# Patient Record
Sex: MALE | Race: ASIAN | NOT HISPANIC OR LATINO | Employment: FULL TIME | URBAN - METROPOLITAN AREA
[De-identification: names, ages, dates, MRNs, and addresses within clinical notes are randomized per-mention and may not be internally consistent; named-entity substitution may affect disease eponyms.]

---

## 2020-10-15 ENCOUNTER — LAB REQUISITION (OUTPATIENT)
Dept: LAB | Facility: HOSPITAL | Age: 30
End: 2020-10-15
Payer: OTHER GOVERNMENT

## 2020-10-15 DIAGNOSIS — Z11.59 ENCOUNTER FOR SCREENING FOR OTHER VIRAL DISEASES: ICD-10-CM

## 2020-10-15 LAB — SARS-COV-2 RNA RESP QL NAA+PROBE: NEGATIVE

## 2020-10-15 PROCEDURE — U0003 INFECTIOUS AGENT DETECTION BY NUCLEIC ACID (DNA OR RNA); SEVERE ACUTE RESPIRATORY SYNDROME CORONAVIRUS 2 (SARS-COV-2) (CORONAVIRUS DISEASE [COVID-19]), AMPLIFIED PROBE TECHNIQUE, MAKING USE OF HIGH THROUGHPUT TECHNOLOGIES AS DESCRIBED BY CMS-2020-01-R: HCPCS | Performed by: PHYSICIAN ASSISTANT

## 2020-12-21 ENCOUNTER — IMMUNIZATIONS (OUTPATIENT)
Dept: FAMILY MEDICINE CLINIC | Facility: HOSPITAL | Age: 30
End: 2020-12-21
Payer: COMMERCIAL

## 2020-12-21 DIAGNOSIS — Z23 ENCOUNTER FOR IMMUNIZATION: ICD-10-CM

## 2020-12-21 PROCEDURE — 91300 SARS-COV-2 / COVID-19 MRNA VACCINE (PFIZER-BIONTECH) 30 MCG: CPT

## 2020-12-21 PROCEDURE — 0001A SARS-COV-2 / COVID-19 MRNA VACCINE (PFIZER-BIONTECH) 30 MCG: CPT

## 2021-01-12 ENCOUNTER — IMMUNIZATIONS (OUTPATIENT)
Dept: FAMILY MEDICINE CLINIC | Facility: HOSPITAL | Age: 31
End: 2021-01-12

## 2021-01-12 DIAGNOSIS — Z23 ENCOUNTER FOR IMMUNIZATION: ICD-10-CM

## 2021-01-12 PROCEDURE — 0002A SARS-COV-2 / COVID-19 MRNA VACCINE (PFIZER-BIONTECH) 30 MCG: CPT

## 2021-01-12 PROCEDURE — 91300 SARS-COV-2 / COVID-19 MRNA VACCINE (PFIZER-BIONTECH) 30 MCG: CPT

## 2021-04-22 ENCOUNTER — OFFICE VISIT (OUTPATIENT)
Dept: DERMATOLOGY | Facility: CLINIC | Age: 31
End: 2021-04-22
Payer: COMMERCIAL

## 2021-04-22 VITALS — TEMPERATURE: 94.6 F | WEIGHT: 152.2 LBS

## 2021-04-22 DIAGNOSIS — D48.5 NEOPLASM OF UNCERTAIN BEHAVIOR OF SKIN OF BACK: Primary | ICD-10-CM

## 2021-04-22 PROCEDURE — 99204 OFFICE O/P NEW MOD 45 MIN: CPT | Performed by: DERMATOLOGY

## 2021-04-22 NOTE — PATIENT INSTRUCTIONS
Assessment and Plan:   I have discussed with the patient that a sample of skin via a "skin biopsy would be potentially helpful to further make a specific diagnosis under the microscope     Based on a thorough discussion of this condition and the management approach to it (including a comprehensive discussion of the known risks, side effects and potential benefits of treatment), the patient (family) agrees to implement the following specific plan:    o Procedure:  Skin Biopsy discussed; deferred at this time due to irritation on site of spot of concern  o Monitor at this time; follow up 1 month, or as needed   o Start applying Triamcinolone 0 1 % ointment  twice a day for 2 weeks straight

## 2021-04-22 NOTE — PROGRESS NOTES
[address] [facility] Note     Patient Name: Alma Genao  Encounter Date: 04/22/21     Have you been cared for by a [facility] Dermatologist in the last 3 years and, if so, which one? No    · Have you traveled outside of the [address] [place] [address] in the past 3 months or outside of the [facility] area in the last 2 weeks? No     May we call your Preferred Phone number to discuss your specific medical information? Yes     May we leave a detailed message that includes your specific medical information? Yes      Today's Chief Concerns:   Concern #1:  Spot on back    Concern #2:      Past Medical History:  Have you personally ever had or currently have any of the following? · Skin cancer (such as Melanoma, Basal Cell Carcinoma, Squamous Cell Carcinoma? (If Yes, please provide more detail)- No  · Eczema: No  · Psoriasis: No  · HIV/AIDS: No  · Hepatitis B or C: No  · Tuberculosis: No  · Systemic Immunosuppression such as Diabetes, Biologic or Immunotherapy, Chemotherapy, Organ Transplantation, Bone Marrow Transplantation (If YES, please provide more detail): No  · Radiation Treatment (If YES, please provide more detail): No  · Any other major medical conditions/concerns? 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 Reading     Family History:  Have any of your "first degree relatives" (parent, brother, sister, or child) had any of the following       · Skin cancer such as Melanoma or Merkel Cell Carcinoma or Pancreatic Cancer? No  · Eczema, Asthma, Hay Fever or Seasonal Allergies: No  · Psoriasis or Psoriatic Arthritis: No  · Do any other medical conditions seem to run in your family? If Yes, what condition and which relatives? No    Current Medications:       No current outpatient medications on file  Review of Systems:  Have you recently had or currently have any of the following?   If YES, what are you doing for the problem? · Fever, chills or unintended weight loss: No  · Sudden loss or change in your vision: No  · Nausea, vomiting or blood in your stool: No  · Painful or swollen joints: No  · Wheezing or cough: No  · Changing mole or non-healing wound: No  · Nosebleeds: No  · Excessive sweating: No  · Easy or prolonged bleeding? No  · Over the last 2 weeks, how often have you been bothered by the following problems? · Taking little interest or pleasure in doing things: 1 - Not at All  · Feeling down, depressed, or hopeless: 1 - Not at All  · Rapid heartbeat with epinephrine:  No    · FEMALES ONLY:    · Are you pregnant or planning to become pregnant? N/A  · Are you currently or planning to be nursing or breast feeding? N/A    · Any known allergies? Not on File      Physical Exam:     Was a chaperone (Derm Clinical Assistant) present throughout the entire Physical Exam? Yes     Did the Dermatology Team specifically  the patient on the importance of a Full Skin Exam to be sure that nothing is missed clinically? Yes}  o Did the patient ultimately request or accept a Full Skin Exam?  NO, spot of concern       CONSTITUTIONAL:   Vitals:    04/22/21 0936   Temp: (!) 94 6 °F (34 8 °C)   Weight: 69 kg (152 lb 3 2 oz)         PSYCH: Normal mood and affect  EYES: Normal conjunctiva  ENT: Normal lips and oral mucosa  CARDIOVASCULAR: No edema  RESPIRATORY: Normal respirations  HEME/LYMPH/IMMUNO:  No regional lymphadenopathy except as noted below in "ASSESSMENT AND PLAN BY DIAGNOSIS"    SKIN:  FULL ORGAN SYSTEM EXAM   Face Normal except as noted below in Assessment   Neck,  Normal except as noted below in Assessment   Back/Spine Normal except as noted below in Assessment        Assessment and Plan by Diagnosis:    History of Present Condition:     Patient is present to establish care to discuss spot that appeared or noticed on back a 6 months ago  Patient reports some itchiness sometimes  Denies any bleeding, pain, changes in size, redness or irritation  Patient has no personal or family history of skin cancer  Patient denies trying to treat area  NEOPLASM OF UNCERTAIN BEHAVIOR OF SKIN    Physical Exam:   (Anatomic Location); (Size and Morphological Description); (Differential Diagnosis):  o symmetrical brown macule with surrounding erythematous pathces   Pertinent Positives:   Pertinent Negatives:    Assessment and Plan:   I have discussed with the patient that a sample of skin via a "skin biopsy would be potentially helpful to further make a specific diagnosis under the microscope   Based on a thorough discussion of this condition and the management approach to it (including a comprehensive discussion of the known risks, side effects and potential benefits of treatment), the patient (family) agrees to implement the following specific plan:    o Procedure:  Skin Biopsy discussed; deferred at this time due to irritation on site of spot of concern  o Monitor at this time; follow up 1 month, or as needed   o Start applying Triamcinolone 0 1 % ointment  twice a day for 2 weeks straight       Right upper buttock with symmetric brown macule with surrounding erythema  Discussed treatments and as is inflamed now, will treat with triamcinolone twice daily for three weeks with plan to re-evaluate once inflammation is resolved  Discussed biopsy as well but patient prefers treatment with the triamcinolone first   Medication side effects discussed and instructions given      Scribe Attestation    I,:  Mansi Gilliland MA am acting as a scribe while in the presence of the attending physician :       I,:  eKyon Lilly MD personally performed the services described in this documentation    as scribed in my presence :

## 2021-06-02 ENCOUNTER — TELEPHONE (OUTPATIENT)
Dept: DERMATOLOGY | Facility: CLINIC | Age: 31
End: 2021-06-02

## 2021-06-02 NOTE — TELEPHONE ENCOUNTER
Pt would like to have skin lesion removed, not sure if it is a mohs procedure or regular procedure, he is going to upload a picture to [facility]  Thank you!

## 2021-06-03 NOTE — TELEPHONE ENCOUNTER
Pt having issues with his MyChart (has duplicate accounts and wont be fixed for ~7days)  Pt is going to tiger text you to see if it would be ok to send to your hospital email his pictures  Pt states that he is more worried as there are more lesions on his back now than before and is more apprehensive about it  Thank you!

## 2021-06-07 ENCOUNTER — OFFICE VISIT (OUTPATIENT)
Dept: FAMILY MEDICINE CLINIC | Facility: CLINIC | Age: 31
End: 2021-06-07
Payer: COMMERCIAL

## 2021-06-07 VITALS
OXYGEN SATURATION: 97 % | SYSTOLIC BLOOD PRESSURE: 110 MMHG | DIASTOLIC BLOOD PRESSURE: 58 MMHG | BODY MASS INDEX: 26.16 KG/M2 | HEIGHT: 64 IN | RESPIRATION RATE: 18 BRPM | WEIGHT: 153.25 LBS | HEART RATE: 80 BPM | TEMPERATURE: 98.2 F

## 2021-06-07 DIAGNOSIS — Z11.4 SCREENING FOR HIV (HUMAN IMMUNODEFICIENCY VIRUS): ICD-10-CM

## 2021-06-07 DIAGNOSIS — L98.9 SKIN LESION: ICD-10-CM

## 2021-06-07 DIAGNOSIS — Z23 ENCOUNTER FOR IMMUNIZATION: ICD-10-CM

## 2021-06-07 DIAGNOSIS — Z00.00 ANNUAL PHYSICAL EXAM: Primary | ICD-10-CM

## 2021-06-07 DIAGNOSIS — Z11.59 NEED FOR HEPATITIS C SCREENING TEST: ICD-10-CM

## 2021-06-07 PROCEDURE — 90715 TDAP VACCINE 7 YRS/> IM: CPT

## 2021-06-07 PROCEDURE — 3725F SCREEN DEPRESSION PERFORMED: CPT | Performed by: FAMILY MEDICINE

## 2021-06-07 PROCEDURE — 99385 PREV VISIT NEW AGE 18-39: CPT | Performed by: FAMILY MEDICINE

## 2021-06-07 PROCEDURE — 90471 IMMUNIZATION ADMIN: CPT

## 2021-06-07 NOTE — PROGRESS NOTES
Assessment/Plan:      1  Annual physical exam  See below   - Comprehensive metabolic panel; Future  - Lipid panel; Future  - C-reactive protein; Future  - Lyme Total Antibody Profile with reflex to WB; Future  - Sedimentation rate, automated; Future    2  Encounter for immunization  - TDAP VACCINE GREATER THAN OR EQUAL TO 6YO IM    3  Need for hepatitis C screening test  - Hepatitis C antibody; Future    4  Screening for HIV (human immunodeficiency virus)  - HIV 1/2 Antigen/Antibody (4th Generation) w Reflex [facility]; Future    5  Skin lesion  To have biopsy with derm tomorrow   Appears to be inflammation   Will order CRP/lyme   - Comprehensive metabolic panel; Future  - Lipid panel; Future  - C-reactive protein; Future  - Lyme Total Antibody Profile with reflex to WB; Future  - Sedimentation rate, automated; Future      Well adult exam  ·         Continue healthy diet   ·         Encourage exercise 4 times a week or more for minimum 30 minutes  ·         Continue to see dentist, wear seatbelt  ·         Health maintenance reviewed and update labs, Tdap today  Reviewed age appropriate health maintenance screenings and immunizations that are due, risks and benefits of these  Health Maintenance   Topic Date Due    HIV Screening  Never done    BMI: Followup Plan  Never done    Annual Physical  Never done    Influenza Vaccine (Season Ended) 09/01/2021    Depression Screening PHQ  06/07/2022    BMI: Adult  06/07/2022    DTaP,Tdap,and Td Vaccines (8 - Td) 06/07/2031    HIB Vaccine  Completed    Hepatitis B Vaccine  Completed    IPV Vaccine  Completed    COVID-19 Vaccine  Completed    Pneumococcal Vaccine: Pediatrics (0 to 5 Years) and At-Risk Patients (6 to 59 Years)  Aged Out    Hepatitis A Vaccine  Aged Out    Meningococcal ACWY Vaccine  Aged Out    HPV Vaccine  Aged Out     No follow-ups on file      Subjective:    JAVI Lay is a 33 y o  male who presents today for a physical      Chief Complaint Patient presents with    Physical Exam     New pt- [id number] Z[address] Chairez with Tdap today and HIV and HEP C      PHQ-9 Depression Screening    PHQ-9:   Frequency of the following problems over the past two weeks:      Little interest or pleasure in doing things: 0 - not at all  Feeling down, depressed, or hopeless: 0 - not at all  PHQ-2 Score: 0        ---Above per clinical staff & reviewed  ---  Patient here today for a physical:    Diet: healthy, vegetarian   Exercise:  Yes   Dental visits:  Yes   Seatbelt: yes     Concerns today:  Diet: vegetarian, vegen protein powder [employer]    Exercise:  Yes - 2 -3 days 45 minues   Dental visits:  Yes   Seatbelt: yes     Concerns today:  Pediatric anesthesiology  Original lesion 6 months ago       from this area   9 months here so far   Living at home right now     Lesion on back - biopsy tomorrow               The following portions of the patient's history were reviewed and updated as appropriate: allergies, current medications, past family history, past medical history, past social history, past surgical history and problem list      Current Outpatient Medications   Medication Sig Dispense Refill    triamcinolone (KENALOG) 0 1 % ointment Apply topically 2 (two) times a day 30 g 0     No current facility-administered medications for this visit  Objective:      /58   Pulse 80   Temp 98 2 °F (36 8 °C)   Resp 18   Ht 5' 3 58" (1 615 m)   Wt 69 5 kg (153 lb 4 oz)   SpO2 97%   BMI 26 65 kg/m²   BP Readings from Last 3 Encounters:   06/07/21 110/58     Wt Readings from Last 3 Encounters:   06/07/21 69 5 kg (153 lb 4 oz)   04/22/21 69 kg (152 lb 3 2 oz)       Review of Systems  ROS:  all others negative - no chest pain, SOB, normal urine and bowels  no GERD  sleeping well  mood good  Physical Exam  Skin:              Constitutional: he appears well-developed and well-nourished  HENT: Head: Normocephalic     Right Ear: External ear normal  Tympanic membrane normal  Left Ear: External ear normal  Tympanic membrane normal    Nose: Nose normal  No mucosal edema, No rhinorrhea  Right sinus exhibits no maxillary sinus tenderness  Left sinus exhibits no maxillary sinus tenderness  Mouth/Throat: Oropharynx is clear and moist    Eyes: Normal conjunctiva  No erythema  No discharge  Neck: No pain on exam  Neck supple  Cardiovascular: Normal rate, regular rhythm and normal heart sounds  Pulmonary/Chest: Effort normal and breath sounds normal  No wheezes  No rales  No rhonchi  Abdominal: Soft  Bowel sounds are normal  There is no tenderness  Musculoskeletal: he exhibits no edema  Lymphadenopathy: he has no cervical adenopathy  Neurological: he  is alert and oriented to person, place, and time  Skin: Skin is warm and dry  No rashes  Psychiatric: he  has a normal mood and affect  his behavior is normal  Thought content normal    Vitals reviewed  BMI Counseling: Body mass index is 26 65 kg/m²  The BMI is above normal  Nutrition recommendations include decreasing portion sizes  Exercise recommendations include exercising 3-5 times per week

## 2021-06-07 NOTE — PROGRESS NOTES
Assessment/Plan:     Problem List Items Addressed This Visit     None      Visit Diagnoses     Annual physical exam    -  Primary    Screening for HIV (human immunodeficiency virus)        Relevant Orders    HIV 1/2 Antigen/Antibody (4th Generation) w Reflex [facility]    Encounter for immunization        Relevant Orders    TDAP VACCINE GREATER THAN OR EQUAL TO 8YO IM    Need for hepatitis C screening test        Relevant Orders    Hepatitis C antibody          Well adult exam  ·         Continue healthy diet   ·         Encourage exercise 4 times a week or more for minimum 30 minutes  ·         Continue to see dentist, wear seatbelt  ·         Health maintenance reviewed and up-to-date  Reviewed age appropriate health maintenance screenings and immunizations that are due, risks and benefits of these  Health Maintenance   Topic Date Due    Depression Screening PHQ  Never done    HIV Screening  Never done    BMI: Followup Plan  Never done    BMI: Adult  Never done    Annual Physical  Never done    DTaP,Tdap,and Td Vaccines (7 - Tdap) 05/20/2020    Influenza Vaccine (Season Ended) 09/01/2021    HIB Vaccine  Completed    Hepatitis B Vaccine  Completed    IPV Vaccine  Completed    COVID-19 Vaccine  Completed    Pneumococcal Vaccine: Pediatrics (0 to 5 Years) and At-Risk Patients (6 to 59 Years)  Aged Out    Hepatitis A Vaccine  Aged Out    Meningococcal ACWY Vaccine  Aged Out    HPV Vaccine  Aged Out     No follow-ups on file  Subjective:    JAVI Lay is a 33 y o  male who presents today for a physical      Chief Complaint   Patient presents with    Physical Exam     New pt- [id number] Zully Chairez with Tdap today and HIV and HEP C      PHQ-9 Depression Screening    PHQ-9:   Frequency of the following problems over the past two weeks:      Little interest or pleasure in doing things: 0 - not at all  Feeling down, depressed, or hopeless: 0 - not at all        ---Above per clinical staff & reviewed   ---  Patient here today for a physical:    Diet: vegetarian, vegen protein powder [employer]    Exercise:  Yes - 2 -3 days 45 minues   Dental visits:  Yes   Seatbelt: yes     Concerns today:  Pediatric anesthesiology  Original lesion 6 months ago           from this area   9 months here so far   Living at home right now     Lesion on back - biopsy tomorrow     The following portions of the patient's history were reviewed and updated as appropriate: allergies, current medications, past family history, past medical history, past social history, past surgical history and problem list      Current Outpatient Medications   Medication Sig Dispense Refill    triamcinolone (KENALOG) 0 1 % ointment Apply topically 2 (two) times a day 30 g 0     No current facility-administered medications for this visit  Objective:      /58   Pulse 80   Temp 98 2 °F (36 8 °C)   Resp 18   Ht 5' 3 58" (1 615 m)   Wt 69 5 kg (153 lb 4 oz)   SpO2 97%   BMI 26 65 kg/m²   BP Readings from Last 3 Encounters:   06/07/21 110/58     Wt Readings from Last 3 Encounters:   06/07/21 69 5 kg (153 lb 4 oz)   04/22/21 69 kg (152 lb 3 2 oz)       Review of Systems  ROS:  all others negative - no chest pain, SOB, normal urine and bowels  no GERD  sleeping well  mood good  ***    Physical Exam   Constitutional: he appears well-developed and well-nourished  HENT: Head: Normocephalic  Right Ear: External ear normal  Tympanic membrane normal    Left Ear: External ear normal  Tympanic membrane normal    Nose: Nose normal  No mucosal edema, No rhinorrhea  Right sinus exhibits no maxillary sinus tenderness  Left sinus exhibits no maxillary sinus tenderness  Mouth/Throat: Oropharynx is clear and moist    Eyes: Normal conjunctiva  No erythema  No discharge  Neck: No pain on exam  Neck supple  Cardiovascular: Normal rate, regular rhythm and normal heart sounds  Pulmonary/Chest: Effort normal and breath sounds normal  No wheezes  No rales  No rhonchi  Abdominal: Soft  Bowel sounds are normal  There is no tenderness  Musculoskeletal: he exhibits no edema  Lymphadenopathy: he has no cervical adenopathy  Neurological: he  is alert and oriented to person, place, and time  Skin: Skin is warm and dry  No rashes  Psychiatric: he  has a normal mood and affect  his behavior is normal  Thought content normal    Vitals reviewed

## 2021-06-07 NOTE — PATIENT INSTRUCTIONS

## 2021-06-08 ENCOUNTER — OFFICE VISIT (OUTPATIENT)
Dept: DERMATOLOGY | Facility: CLINIC | Age: 31
End: 2021-06-08
Payer: COMMERCIAL

## 2021-06-08 VITALS — BODY MASS INDEX: 28.89 KG/M2 | TEMPERATURE: 97.3 F | WEIGHT: 153 LBS | HEIGHT: 61 IN

## 2021-06-08 DIAGNOSIS — R21 RASH: Primary | ICD-10-CM

## 2021-06-08 DIAGNOSIS — A87.0: ICD-10-CM

## 2021-06-08 PROCEDURE — 3008F BODY MASS INDEX DOCD: CPT | Performed by: DERMATOLOGY

## 2021-06-08 PROCEDURE — 11105 PUNCH BX SKIN EA SEP/ADDL: CPT | Performed by: DERMATOLOGY

## 2021-06-08 PROCEDURE — 88312 SPECIAL STAINS GROUP 1: CPT | Performed by: STUDENT IN AN ORGANIZED HEALTH CARE EDUCATION/TRAINING PROGRAM

## 2021-06-08 PROCEDURE — 99213 OFFICE O/P EST LOW 20 MIN: CPT | Performed by: DERMATOLOGY

## 2021-06-08 PROCEDURE — 88305 TISSUE EXAM BY PATHOLOGIST: CPT | Performed by: STUDENT IN AN ORGANIZED HEALTH CARE EDUCATION/TRAINING PROGRAM

## 2021-06-08 PROCEDURE — 11104 PUNCH BX SKIN SINGLE LESION: CPT | Performed by: DERMATOLOGY

## 2021-06-08 PROCEDURE — 1036F TOBACCO NON-USER: CPT | Performed by: DERMATOLOGY

## 2021-06-08 NOTE — PROGRESS NOTES
[facility] Note     Patient Name: Lidia Schneider  Encounter Date: 06/08/2021     Have you been cared for by a [facility] in the last 3 years and, if so, which one? No    · Have you traveled outside of the [address] [place] [address] in the past 3 months or outside of the [facility] area in the last 2 weeks? No     May we call your Preferred Phone number to discuss your specific medical information? Yes     May we leave a detailed message that includes your specific medical information? Yes      Today's Chief Concerns:   Concern #1:  Skin Lesion on back     Past Medical History:  Have you personally ever had or currently have any of the following? · Skin cancer (such as Melanoma, Basal Cell Carcinoma, Squamous Cell Carcinoma? (If Yes, please provide more detail)- No  · Eczema: No  · Psoriasis: No  · HIV/AIDS: No  · Hepatitis B or C: No  · Tuberculosis: No  · Systemic Immunosuppression such as Diabetes, Biologic or Immunotherapy, Chemotherapy, Organ Transplantation, Bone Marrow Transplantation (If YES, please provide more detail): No  · Radiation Treatment (If YES, please provide more detail): No  · Any other major medical conditions/concerns? 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 Exercise     Family History:  Have any of your "first degree relatives" (parent, brother, sister, or child) had any of the following       · Skin cancer such as Melanoma or Merkel Cell Carcinoma or Pancreatic Cancer? No  · Eczema, Asthma, Hay Fever or Seasonal Allergies: YES, asthma   · Psoriasis or Psoriatic Arthritis: No  · Do any other medical conditions seem to run in your family? If Yes, what condition and which relatives?   No    Current Medications:     Current Outpatient Medications:     triamcinolone (KENALOG) 0 1 % ointment, Apply topically 2 (two) times a day, Disp: 30 g, Rfl: 0      Review of Systems:  Have you recently had or currently have any of the following? If YES, what are you doing for the problem? · Fever, chills or unintended weight loss: No  · Sudden loss or change in your vision: No  · Nausea, vomiting or blood in your stool: No  · Painful or swollen joints: No  · Wheezing or cough: No  · Changing mole or non-healing wound: No  · Nosebleeds: No  · Excessive sweating: No  · Easy or prolonged bleeding? No  · Over the last 2 weeks, how often have you been bothered by the following problems? · Taking little interest or pleasure in doing things: 1 - Not at All  · Feeling down, depressed, or hopeless: 1 - Not at All  · Rapid heartbeat with epinephrine:  No    · FEMALES ONLY:    · Are you pregnant or planning to become pregnant? N/A  · Are you currently or planning to be nursing or breast feeding? N/A    · Any known allergies? Allergies   Allergen Reactions    Acetaminophen Hives     Reaction Date: [id number];    ·       Physical Exam:     Was a chaperone (Derm Clinical Assistant) present throughout the entire Physical Exam? Yes     Did the Dermatology Team specifically  the patient on the importance of a Full Skin Exam to be sure that nothing is missed clinically?  Yes}  o Did the patient ultimately request or accept a Full Skin Exam?  NO  o Did the patient specifically refuse to have the areas "under-the-bra" examined by the Dermatologist? No  o Did the patient specifically refuse to have the areas "under-the-underwear" examined by the Dermatologist? No    CONSTITUTIONAL:   Vitals:    06/08/21 1448   Temp: (!) 97 3 °F (36 3 °C)   TempSrc: Tympanic   Weight: 69 4 kg (153 lb)   Height: 5' 1" (1 549 m)       PSYCH: Normal mood and affect  EYES: Normal conjunctiva  ENT: Normal lips and oral mucosa  CARDIOVASCULAR: No edema  RESPIRATORY: Normal respirations  HEME/LYMPH/IMMUNO:  No regional lymphadenopathy except as noted below in "ASSESSMENT AND PLAN BY DIAGNOSIS"    SKIN:  FULL ORGAN SYSTEM EXAM   Hair, Scalp, Ears, Face Normal except as noted below in Assessment   Neck, Cervical Chain Nodes Normal except as noted below in Assessment   Right Arm/Hand/Fingers    Left Arm/Hand/Fingers    Chest/Breasts/Axillae    Abdomen, Umbilicus    Back/Spine    Groin/Genitalia/Buttocks    Right Leg, Foot, Toes    Left Leg, Foot, Toes         Assessment and Plan by Diagnosis:    History of Present Condition:     Duration:  How long has this been an issue for you? o  6 months    Location Affected:  Where on the body is this affecting you?    o  Back    Quality:  Is there any bleeding, pain, itch, burning/irritation, or redness associated with the skin lesion?    o  [place]   Severity:  Describe any bleeding, pain, itch, burning/irritation, or redness on a scale of 1 to 10 (with 10 being the worst)  o  5   Timing:  Does this condition seem to be there pretty constantly or do you notice it more at specific times throughout the day? o  constantly    Context:  Have you ever noticed that this condition seems to be associated with specific activities you do?    o  Denies    Modifying Factors:    o Anything that seems to make the condition worse?    -  Denies   o What have you tried to do to make the condition better? -  TMC    Associated Signs and Symptoms:  Does this skin lesion seem to be associated with any of the following:  o  SL AMB DERM SIGNS AND SYMPTOMS: Itching and Scratching     o   See below for consent that was obtained from patient and subsequent Procedure Note  1  RASH    Physical Exam:   (Anatomic Location); (Size and Morphological Description); (Differential Diagnosis):  o Right mid back and right lower back with few erythematous plaque with disc center  DIFF: Erythema Multiform versus   Fixed drug eruption versus Pityriasis Rosea     Pertinent Positives:   Pertinent Negatives: n/a    Additional History of Present Condition:  Located on thelower back  Present for 6 months  Reports scratching sometime  Was treated with Triamcinolone 0 1% Cream  Twice a day for  x 2 weeks  Denies any improvement   Assessment and Plan:  Based on a thorough discussion of this condition and the management approach to it (including a comprehensive discussion of the known risks, side effects and potential benefits of treatment), the patient (family) agrees to implement the following specific plan:   Punch biopsy perform today     A: PROCEDURE NOTE:  PUNCH BIOPSY      Performing Physician: Dr Villarreal    Anatomic Location; Clinical Description with size (cm); Pre-Op Diagnosis:     Right mid back with few erythematous plaque with disc center  DIFF: Erythema Multiform versus  Fixed drug eruption versus Pityriasis Rosea         Anesthesia: 1% xylocaine with epi       Topical anesthesia: None       B: PROCEDURE NOTE:  PUNCH BIOPSY      Performing Physician: Dr Villarreal    Anatomic Location; Clinical Description with size (cm); Pre-Op Diagnosis:     Right lower back with few erythematous plaque with disc center  DIFF: Erythema Multiform versus  Fixed drug eruption versus Pityriasis Rosea         Anesthesia: 1% xylocaine with epi       Topical anesthesia: None       Indications: To indicate diagnosis and management plan  Procedure Details     Patient informed of the risks (including bleeding,scaring and infection) and benefits of the procedure explained  Verbal and written informed consent obtained  The area was prepped and draped in the usual fashion  Anesthesia was obtained with 1% lidocaine with epinephrine  The skin was then stretched perpendicular to the skin tension lines and a punch biopsy to an appropriate sampling depth was obtained with a 4 mm punch with a forceps and iris scissors  Hemostasis was obtained with 4-0 Ethilon x 2 sutures  Complications:  None      Specimen has been sent for review by Dermatopathology  Plan:  1   Instructed to keep the wound dry and covered for 24-48h and clean thereafter  2  Warning signs of infection were reviewed  3  Recommended that the patient use acetaminophen as needed for pain  4  Sutures if any should be removed in 14 days      Standard post-procedure care has been explained and has been included in written form within the patient's copy of Informed Consent  Right mid and lower back with oval patches with dusky centers suggestive of EM vs fixed drug in appearance  Did not resolve with topical steroid so punch biopsy obtained from newer and older lesions  Well tolerated  Will contact with final results  S/R in 2 weeks  Will hold lab work for inflammatory conditions until punch biopsy results      Scribe Attestation    I,:  Elisa Vasques MA am acting as a scribe while in the presence of the attending physician :       I,:  Michael Helms MD personally performed the services described in this documentation    as scribed in my presence :

## 2021-06-08 NOTE — PATIENT INSTRUCTIONS
SKIN PUNCH BIOPSY    Rationale for Procedure  A skin punch biopsy allows the dermatologist to further examine a lesion or rash under the microscope to potentially obtain a more specific diagnosis  It usually involves numbing the area with numbing medication and removing a small piece of skin  Usually, the area will be closed with sutures at the end of the procedure  Sutures usually need to be removed in 5 to 7 days of the biopsy was performed on the face or in 10 to 14 days if performed elsewhere on the body  Description of Procedure  We would like to perform a skin punch biopsy today  A local anesthetic, similar to the kind that a dentist uses when filling a cavity, will be injected with a very small needle into the skin area to be sampled  The injected skin and tissue underneath should go to sleep and become numbed so that no further pain should be felt  An instrument shaped like a tiny "cookie cutter" (i e , the punch biopsy instrument) will be used to cut a small round piece of skin and tissue from the area so that the sample may be taken and examined more closely under the microscope  A slight amount of bleeding will occur, but it is usually stopped with direct pressure, chemical cautery, and/or a pressure bandage; rarely, electrocautery and other means of intervention may be necessary to help stop the bleeding  A few sutures/stitches are usually applied to help aid in wound healing  Surgical Vaseline-type ointment will also applied after the procedure to help create a barrier between the wound and the outside world      Risks and Potential Complications  While the advantage of a skin punch biopsy is that it allows us to potentially examine the skin more closely under the microscope, there are some risks and potential complications that include but are not limited to the following:  - Bleeding  - Infection  - Pain  - Scar/keloid  - Skin discoloration  - Incomplete removal of the lesion or rash being sampled (in other words, this procedure is intended as a sampling and is not considered a definitive treatment)  - Recurrence of the lesion or rash being sampled  - Nerve Damage/Numbness/Loss of Function  - Allergic Reaction to Anesthesia  - Biopsies are diagnostic procedures and, based on findings, additional treatment or evaluation may be required (in other words, this procedure is intended as a sampling and is not considered a definitive treatment)  - Loss or destruction of the sample specimen could result in no additional findings  - The person at the microscope may not be able to provide additional information other than what we already know or suspect  What You Will Need to Do After the Procedure  1  Keep the area clean and dry  Try NOT to remove the bandage for the first 24 hours  2  Gently clean the area and apply Vaseline ointment (this is over the counter and not a prescription) to the biopsy site for up to 2 weeks  3  If any sutures were placed, return for suture removal as instructed (generally 1 week for the face, 2 weeks for the body)  4  Take Acetaminophen (Tylenol) for discomfort, if no contraindications  Do NOT take Ibuprofen or aspirin unless specifically told to do so by your Dermatologist because these medications can make bleeding worse  5  Call our office immediately for signs of infection: fever, chills, increased redness, warmth, tenderness, discomfort/pain, or pus or foul smell coming from the wound  If a small amount of bleeding is noticed, place a clean cloth over the area and apply firm pressure for ten minutes  Check the wound ONLY after 10 minutes of direct pressure; do not cheat and sneak a peak, as that does not count  If bleeding persists after 10 minutes of legitimate direct pressure, then try one more round of direct pressure for an additional 10 minutes to the area    Should the bleeding become heavier or not stop after the second attempt, call [facility] directly at [phone] (SKIN) or, if after hours, go to your local Emergency Room/Emergency Department

## 2021-06-09 ENCOUNTER — LAB (OUTPATIENT)
Dept: LAB | Facility: HOSPITAL | Age: 31
End: 2021-06-09
Payer: COMMERCIAL

## 2021-06-09 DIAGNOSIS — Z11.4 SCREENING FOR HIV (HUMAN IMMUNODEFICIENCY VIRUS): ICD-10-CM

## 2021-06-09 DIAGNOSIS — Z00.00 ANNUAL PHYSICAL EXAM: ICD-10-CM

## 2021-06-09 DIAGNOSIS — Z11.59 NEED FOR HEPATITIS C SCREENING TEST: ICD-10-CM

## 2021-06-09 DIAGNOSIS — L98.9 SKIN LESION: ICD-10-CM

## 2021-06-09 LAB
ALBUMIN SERPL BCP-MCNC: 4 G/DL (ref 3.5–5)
ALP SERPL-CCNC: 67 U/L (ref 46–116)
ALT SERPL W P-5'-P-CCNC: 87 U/L (ref 12–78)
ANION GAP SERPL CALCULATED.3IONS-SCNC: 7 MMOL/L (ref 4–13)
AST SERPL W P-5'-P-CCNC: 50 U/L (ref 5–45)
BILIRUB SERPL-MCNC: 0.49 MG/DL (ref 0.2–1)
BUN SERPL-MCNC: 14 MG/DL (ref 5–25)
CALCIUM SERPL-MCNC: 9.5 MG/DL (ref 8.3–10.1)
CHLORIDE SERPL-SCNC: 106 MMOL/L (ref 100–108)
CHOLEST SERPL-MCNC: 163 MG/DL (ref 50–200)
CO2 SERPL-SCNC: 26 MMOL/L (ref 21–32)
CREAT SERPL-MCNC: 0.8 MG/DL (ref 0.6–1.3)
GFR SERPL CREATININE-BSD FRML MDRD: 117 ML/MIN/1.73SQ M
GLUCOSE P FAST SERPL-MCNC: 87 MG/DL (ref 65–99)
HCV AB SER QL: NORMAL
HDLC SERPL-MCNC: 53 MG/DL
LDLC SERPL CALC-MCNC: 101 MG/DL (ref 0–100)
NONHDLC SERPL-MCNC: 110 MG/DL
POTASSIUM SERPL-SCNC: 3.8 MMOL/L (ref 3.5–5.3)
PROT SERPL-MCNC: 7.1 G/DL (ref 6.4–8.2)
SODIUM SERPL-SCNC: 139 MMOL/L (ref 136–145)
TRIGL SERPL-MCNC: 47 MG/DL

## 2021-06-09 PROCEDURE — 80053 COMPREHEN METABOLIC PANEL: CPT

## 2021-06-09 PROCEDURE — 86803 HEPATITIS C AB TEST: CPT

## 2021-06-09 PROCEDURE — 80061 LIPID PANEL: CPT

## 2021-06-09 PROCEDURE — 87389 HIV-1 AG W/HIV-1&-2 AB AG IA: CPT

## 2021-06-09 PROCEDURE — 36415 COLL VENOUS BLD VENIPUNCTURE: CPT

## 2021-06-10 LAB — HIV 1+2 AB+HIV1 P24 AG SERPL QL IA: NORMAL

## 2021-06-11 DIAGNOSIS — R74.01 ELEVATED ALT MEASUREMENT: Primary | ICD-10-CM

## 2021-06-16 ENCOUNTER — PATIENT MESSAGE (OUTPATIENT)
Dept: DERMATOLOGY | Facility: CLINIC | Age: 31
End: 2021-06-16

## 2021-06-16 DIAGNOSIS — L43.9 LICHEN PLANUS: Primary | ICD-10-CM

## 2021-06-16 RX ORDER — FLUOCINONIDE 0.5 MG/G
OINTMENT TOPICAL 2 TIMES DAILY
Qty: 30 G | Refills: 0 | Status: SHIPPED | OUTPATIENT
Start: 2021-06-16 | End: 2021-10-05 | Stop reason: ALTCHOICE

## 2021-07-29 ENCOUNTER — TELEPHONE (OUTPATIENT)
Dept: FAMILY MEDICINE CLINIC | Facility: CLINIC | Age: 31
End: 2021-07-29

## 2021-07-29 NOTE — TELEPHONE ENCOUNTER
Patient spoke with THE [facility] yesterday and stated he needs an updated lab slip using preventative codes for his blood work that was completed on 6/9/21so that they are covered

## 2021-07-29 NOTE — TELEPHONE ENCOUNTER
I will take care of forwarding the information, also, patient wants to know why the Hepatitis and HIV screenings are ordered when they are not covered and now he is getting a bill for them that  He is not happy about

## 2021-07-30 NOTE — TELEPHONE ENCOUNTER
They are a USPTF recommendation and therefore should be covered by insurance  Can you please call his insurance company to see why it is not covered

## 2021-07-30 NOTE — TELEPHONE ENCOUNTER
Placed call to insurance, pt was charged for pathology panel test - he was charged $28 11 due to pt not meeting the deductible       Reference number - [id number]

## 2021-09-25 ENCOUNTER — IMMUNIZATIONS (OUTPATIENT)
Dept: FAMILY MEDICINE CLINIC | Facility: HOSPITAL | Age: 31
End: 2021-09-25

## 2021-09-25 DIAGNOSIS — Z23 ENCOUNTER FOR IMMUNIZATION: Primary | ICD-10-CM

## 2021-09-25 PROCEDURE — 0001A SARS-COV-2 / COVID-19 MRNA VACCINE (PFIZER-BIONTECH) 30 MCG: CPT

## 2021-09-25 PROCEDURE — 91300 SARS-COV-2 / COVID-19 MRNA VACCINE (PFIZER-BIONTECH) 30 MCG: CPT

## 2021-10-05 ENCOUNTER — OFFICE VISIT (OUTPATIENT)
Dept: DERMATOLOGY | Facility: CLINIC | Age: 31
End: 2021-10-05
Payer: COMMERCIAL

## 2021-10-05 VITALS — WEIGHT: 158 LBS | BODY MASS INDEX: 26.33 KG/M2 | HEIGHT: 65 IN | TEMPERATURE: 98.5 F

## 2021-10-05 DIAGNOSIS — L43.9 LICHEN PLANUS: Primary | ICD-10-CM

## 2021-10-05 PROCEDURE — 1036F TOBACCO NON-USER: CPT | Performed by: DERMATOLOGY

## 2021-10-05 PROCEDURE — 99213 OFFICE O/P EST LOW 20 MIN: CPT | Performed by: DERMATOLOGY

## 2021-10-05 PROCEDURE — 3008F BODY MASS INDEX DOCD: CPT | Performed by: DERMATOLOGY

## 2021-10-05 RX ORDER — TACROLIMUS 1 MG/G
OINTMENT TOPICAL
Qty: 100 G | Refills: 3 | Status: SHIPPED | OUTPATIENT
Start: 2021-10-05

## 2021-10-05 RX ORDER — CLOBETASOL PROPIONATE 0.5 MG/G
OINTMENT TOPICAL
Qty: 60 G | Refills: 2 | Status: SHIPPED | OUTPATIENT
Start: 2021-10-05

## 2021-10-06 ENCOUNTER — TELEPHONE (OUTPATIENT)
Dept: DERMATOLOGY | Facility: CLINIC | Age: 31
End: 2021-10-06

## 2021-10-13 DIAGNOSIS — L43.9 LICHEN PLANUS: Primary | ICD-10-CM

## 2021-10-13 RX ORDER — PIMECROLIMUS 10 MG/G
CREAM TOPICAL 2 TIMES DAILY
Qty: 30 G | Refills: 0 | Status: SHIPPED | OUTPATIENT
Start: 2021-10-13 | End: 2021-10-13

## 2021-10-13 RX ORDER — PIMECROLIMUS 10 MG/G
CREAM TOPICAL 2 TIMES DAILY
Qty: 100 G | Refills: 1 | Status: SHIPPED | OUTPATIENT
Start: 2021-10-13

## 2021-10-14 ENCOUNTER — TELEPHONE (OUTPATIENT)
Dept: DERMATOLOGY | Facility: CLINIC | Age: 31
End: 2021-10-14

## 2021-10-18 ENCOUNTER — TELEPHONE (OUTPATIENT)
Dept: DERMATOLOGY | Facility: CLINIC | Age: 31
End: 2021-10-18

## 2021-10-28 ENCOUNTER — TELEPHONE (OUTPATIENT)
Dept: DERMATOLOGY | Facility: CLINIC | Age: 31
End: 2021-10-28

## 2021-10-29 ENCOUNTER — TELEPHONE (OUTPATIENT)
Dept: DERMATOLOGY | Facility: CLINIC | Age: 31
End: 2021-10-29

## 2022-10-24 ENCOUNTER — OFFICE VISIT (OUTPATIENT)
Dept: FAMILY MEDICINE CLINIC | Facility: CLINIC | Age: 32
End: 2022-10-24
Payer: COMMERCIAL

## 2022-10-24 VITALS
BODY MASS INDEX: 26.19 KG/M2 | RESPIRATION RATE: 16 BRPM | DIASTOLIC BLOOD PRESSURE: 70 MMHG | SYSTOLIC BLOOD PRESSURE: 110 MMHG | TEMPERATURE: 98.7 F | OXYGEN SATURATION: 98 % | WEIGHT: 157.2 LBS | HEIGHT: 65 IN | HEART RATE: 60 BPM

## 2022-10-24 DIAGNOSIS — Z13.29 SCREENING FOR ENDOCRINE DISORDER: ICD-10-CM

## 2022-10-24 DIAGNOSIS — L43.9 LICHEN PLANUS: ICD-10-CM

## 2022-10-24 DIAGNOSIS — Z83.49 FAMILY HISTORY OF HYPOTHYROIDISM: ICD-10-CM

## 2022-10-24 DIAGNOSIS — E55.9 VITAMIN D DEFICIENCY: ICD-10-CM

## 2022-10-24 DIAGNOSIS — E54 ASCORBIC ACID DEFICIENCY: ICD-10-CM

## 2022-10-24 DIAGNOSIS — Z13.6 SCREENING FOR CARDIOVASCULAR CONDITION: ICD-10-CM

## 2022-10-24 DIAGNOSIS — E53.8 CYANOCOBALAMIN DEFICIENCY: ICD-10-CM

## 2022-10-24 DIAGNOSIS — R79.89 ELEVATED LFTS: Primary | ICD-10-CM

## 2022-10-24 DIAGNOSIS — G89.29 CHRONIC PAIN OF BOTH KNEES: ICD-10-CM

## 2022-10-24 DIAGNOSIS — M25.562 CHRONIC PAIN OF BOTH KNEES: ICD-10-CM

## 2022-10-24 DIAGNOSIS — Z80.6 FAMILY HISTORY OF LEUKEMIA: ICD-10-CM

## 2022-10-24 DIAGNOSIS — M25.561 CHRONIC PAIN OF BOTH KNEES: ICD-10-CM

## 2022-10-24 PROCEDURE — 99214 OFFICE O/P EST MOD 30 MIN: CPT | Performed by: FAMILY MEDICINE

## 2022-10-24 NOTE — PROGRESS NOTES
Name: Jensen Shepard      : 1990      MRN: [id number]  Encounter Provider: Mecca Joe MD  Encounter Date: 10/24/2022   Encounter department: [address] [place] [address] [facility]    Assessment & Plan       Review results with patient detail will need to follow-up liver enzyme  Check his vitamin since his skin lesion can be from significant vitamin deficiency specially  B12 vitamin-C  Check for JOSH level since lichen planus can also be a sign of autoimmune marker  He is up-to-date with flu vaccine and COVID vaccine  Will notify patient blood test results  I have spent 30 minutes with Patient  today in which greater than 50% of this time was spent in counseling/coordination of care regarding Diagnostic results, Prognosis, Risks and benefits of tx options and Intructions for management  1  Elevated LFTs  -     CBC and differential; Future  -     Comprehensive metabolic panel; Future  -     TSH, 3rd generation with Free T4 reflex; Future  -     UA w Reflex to Microscopic w Reflex to Culture    2  Lichen planus  -     Vitamin C; Future  -     Vitamin D 25 hydroxy; Future  -     JOSH Screen w/ Reflex to Titer/Pattern; Future    3  Chronic pain of both knees    4  Cyanocobalamin deficiency  -     Vitamin B12; Future    5  Vitamin D deficiency  -     Vitamin D 25 hydroxy; Future    6  Screening for cardiovascular condition  -     Lipid Panel with Direct LDL reflex; Future    7  Screening for endocrine disorder    8  Family history of hypothyroidism    9  Family history of leukemia    10  Ascorbic acid deficiency  -     Vitamin C; Future    BMI Counseling: Body mass index is 26 16 kg/m²  The BMI is above normal  Nutrition recommendations include decreasing portion sizes, encouraging healthy choices of fruits and vegetables, limiting drinks that contain sugar and reducing intake of cholesterol  Exercise recommendations include exercising 3-5 times per week  No pharmacotherapy was ordered   Rationale for BMI follow-up plan is due to patient being overweight or obese  Depression Screening and Follow-up Plan: Patient was screened for depression during today's encounter  They screened negative with a PHQ-2 score of 0  Subjective      25-year-old male for [facility] care  He works as a locum pediatric anesthesiologist  2020 he had [place] vaccine then he start having a rash he saw dermatologist had biopsy showed lichen planus  He was on high-dose topical steroid now he seemed to get better but still w scarring lesions on his body  He played tennis and exercise every day he has some knee pain he takes glucosamine chondroitin  Family history of hypothyroidism leukemia asthma  He lives alone he does not smoke drink alcohol only occasionally  Last year he had blood work showed liver enzyme high  Otherwise he feel fine no weight loss no fatigue he is peskatarian in diet eat fish and ache occasionally  No trouble urination or bowel movement  Review of Systems   Constitutional: Negative for activity change, appetite change, fatigue, fever and unexpected weight change  HENT: Negative for dental problem and trouble swallowing  Eyes: Negative for photophobia and visual disturbance  Respiratory: Negative for cough and chest tightness  Cardiovascular: Negative for chest pain, palpitations and leg swelling  Gastrointestinal: Negative for abdominal pain, constipation and vomiting  Endocrine: Negative for cold intolerance, polydipsia and polyuria  Genitourinary: Negative for difficulty urinating, frequency and urgency  Musculoskeletal: Negative for arthralgias, joint swelling, myalgias and neck pain  Skin: Negative for color change, rash and wound  Allergic/Immunologic: Negative for environmental allergies  Neurological: Negative for dizziness, weakness and numbness  Hematological: Does not bruise/bleed easily     Psychiatric/Behavioral: Negative for decreased concentration, dysphoric mood, self-injury, sleep disturbance and suicidal ideas  Current Outpatient Medications on File Prior to Visit   Medication Sig   • [DISCONTINUED] clobetasol (TEMOVATE) 0 05 % ointment Apply topically twice a day for 2 weeks, then stop for 1 week, then repeat   • [DISCONTINUED] pimecrolimus (ELIDEL) 1 % cream Apply topically 2 (two) times a day   • [DISCONTINUED] tacrolimus (PROTOPIC) 0 1 % ointment Apply topically twice a day to affected areas       Objective     /70 (BP Location: Left arm, Patient Position: Sitting, Cuff Size: Large)   Pulse 60   Temp 98 7 °F (37 1 °C) (Temporal)   Resp 16   Ht 5' 5" (1 651 m)   Wt 71 3 kg (157 lb 3 2 oz)   SpO2 98%   BMI 26 16 kg/m²     Physical Exam  Vitals and nursing note reviewed  Constitutional:       Appearance: Normal appearance  He is well-developed and normal weight  HENT:      Head: Normocephalic and atraumatic  Right Ear: Tympanic membrane, ear canal and external ear normal       Left Ear: Tympanic membrane, ear canal and external ear normal       Nose: Nose normal       Mouth/Throat:      Mouth: Mucous membranes are moist       Pharynx: Oropharynx is clear  Eyes:      Extraocular Movements: Extraocular movements intact  Pupils: Pupils are equal, round, and reactive to light  Cardiovascular:      Rate and Rhythm: Normal rate and regular rhythm  Pulses: Normal pulses  Heart sounds: Normal heart sounds  Pulmonary:      Effort: Pulmonary effort is normal       Breath sounds: Normal breath sounds  Abdominal:      General: Abdomen is flat  Bowel sounds are normal       Palpations: Abdomen is soft  Musculoskeletal:         General: Normal range of motion  Cervical back: Normal range of motion and neck supple  Skin:     General: Skin is warm and dry  Capillary Refill: Capillary refill takes less than 2 seconds  Findings: Lesion present        Comments: Flat hyper pigmentation skin lesions generalized on his back arms legs no pruritic nontender  Dark lines on his nail beds   Neurological:      General: No focal deficit present  Mental Status: He is alert and oriented to person, place, and time  Mental status is at baseline  Psychiatric:         Mood and Affect: Mood normal          Behavior: Behavior normal          Thought Content:  Thought content normal          Judgment: Judgment normal        Scott Whatley MD